# Patient Record
Sex: FEMALE | Race: WHITE | Employment: PART TIME | URBAN - METROPOLITAN AREA
[De-identification: names, ages, dates, MRNs, and addresses within clinical notes are randomized per-mention and may not be internally consistent; named-entity substitution may affect disease eponyms.]

---

## 2018-04-13 PROBLEM — M54.31 SCIATICA OF RIGHT SIDE: Status: ACTIVE | Noted: 2018-04-13

## 2018-08-28 ENCOUNTER — ANESTHESIA EVENT (OUTPATIENT)
Dept: ENDOSCOPY | Facility: HOSPITAL | Age: 48
End: 2018-08-28
Payer: COMMERCIAL

## 2018-08-28 ENCOUNTER — HOSPITAL ENCOUNTER (OUTPATIENT)
Facility: HOSPITAL | Age: 48
Setting detail: HOSPITAL OUTPATIENT SURGERY
Discharge: HOME | End: 2018-08-28
Attending: INTERNAL MEDICINE | Admitting: INTERNAL MEDICINE
Payer: COMMERCIAL

## 2018-08-28 ENCOUNTER — ANESTHESIA (OUTPATIENT)
Dept: ENDOSCOPY | Facility: HOSPITAL | Age: 48
End: 2018-08-28
Payer: COMMERCIAL

## 2018-08-28 ENCOUNTER — SURGERY (OUTPATIENT)
Age: 48
End: 2018-08-28

## 2018-08-28 VITALS
OXYGEN SATURATION: 100 % | RESPIRATION RATE: 14 BRPM | WEIGHT: 210 LBS | DIASTOLIC BLOOD PRESSURE: 88 MMHG | HEART RATE: 53 BPM | BODY MASS INDEX: 32.96 KG/M2 | SYSTOLIC BLOOD PRESSURE: 129 MMHG | HEIGHT: 67 IN

## 2018-08-28 DIAGNOSIS — K62.5 RECTAL BLEEDING: ICD-10-CM

## 2018-08-28 DIAGNOSIS — Z86.010 HISTORY OF COLON POLYPS: ICD-10-CM

## 2018-08-28 LAB — B-HCG UR QL: NEGATIVE

## 2018-08-28 PROCEDURE — 0D5P8ZZ DESTRUCTION OF RECTUM, VIA NATURAL OR ARTIFICIAL OPENING ENDOSCOPIC: ICD-10-PCS | Performed by: INTERNAL MEDICINE

## 2018-08-28 PROCEDURE — 81025 URINE PREGNANCY TEST: CPT

## 2018-08-28 RX ORDER — NALOXONE HYDROCHLORIDE 0.4 MG/ML
80 INJECTION, SOLUTION INTRAMUSCULAR; INTRAVENOUS; SUBCUTANEOUS AS NEEDED
Status: DISCONTINUED | OUTPATIENT
Start: 2018-08-28 | End: 2018-08-28

## 2018-08-28 RX ORDER — LIDOCAINE HYDROCHLORIDE 10 MG/ML
INJECTION, SOLUTION EPIDURAL; INFILTRATION; INTRACAUDAL; PERINEURAL AS NEEDED
Status: DISCONTINUED | OUTPATIENT
Start: 2018-08-28 | End: 2018-08-28 | Stop reason: SURG

## 2018-08-28 RX ORDER — SODIUM CHLORIDE, SODIUM LACTATE, POTASSIUM CHLORIDE, CALCIUM CHLORIDE 600; 310; 30; 20 MG/100ML; MG/100ML; MG/100ML; MG/100ML
INJECTION, SOLUTION INTRAVENOUS CONTINUOUS
Status: DISCONTINUED | OUTPATIENT
Start: 2018-08-28 | End: 2018-08-28

## 2018-08-28 RX ADMIN — LIDOCAINE HYDROCHLORIDE 50 MG: 10 INJECTION, SOLUTION EPIDURAL; INFILTRATION; INTRACAUDAL; PERINEURAL at 09:05:00

## 2018-08-28 RX ADMIN — SODIUM CHLORIDE, SODIUM LACTATE, POTASSIUM CHLORIDE, CALCIUM CHLORIDE: 600; 310; 30; 20 INJECTION, SOLUTION INTRAVENOUS at 08:30:00

## 2018-08-28 NOTE — OPERATIVE REPORT
COLONOSCOPY REPORT    Patient Name:  Jake Freitas Record #: [id number]  YOB: 1970  Date of Procedure: 8/28/2018    Referring physician: Roshni Hodgson MD     Colonoscopy to cecum with hemorrhoidal ligation of 3 columns of diet  Next colonoscopy in 5 years  Patient will follow up with me prn recurrent rectal bleeding    Specimens: none  EBL: minimal

## 2018-08-28 NOTE — ANESTHESIA POSTPROCEDURE EVALUATION
Patient: Dayton Adas    Procedure Summary     Date:  08/28/18 Room / Location:  [address] [place] [address] ENDOSCOPY 01 / [address] [place] [address] ENDOSCOPY    Anesthesia Start:  7874 Anesthesia Stop:  2019    Procedure:  COLONOSCOPY (N/A ) Diagnosis:       Rectal bleeding      History of colon

## 2018-08-28 NOTE — ANESTHESIA PREPROCEDURE EVALUATION
Anesthesia PreOp Note    HPI:     Cherelle Moore is a 50year old female who presents for preoperative consultation requested by: Joel Green MD    Date of Surgery: 8/28/2018    Procedure(s):  COLONOSCOPY  Indication: Rectal bleeding  History of c Right      Comment: Right breast lumpectomy with wire localization      Prescriptions Prior to Admission:  ALLEGRA 180 MG OR TABS 1 TABLET DAILY prn Disp:  Rfl:  8/21/2018   MULTI-VITAMIN OR 1 tablet per day Disp:  Rfl:  8/21/2018       Current Facility-Ad ROS   Abdominal   (+) obese,              Anesthesia Plan:   ASA:  2  Plan:   MAC  Informed Consent Plan and Risks Discussed With:  Patient      I have informed Shane Julio and/or legal guardian or family member of the nature of the anesthetic plan, b

## 2018-08-28 NOTE — H&P
RE-PROCEDURE UPDATE    HPI: Jon Rasheed is a 50year old female. 4/21/1970. Patient presents for a colonoscopy with possible hemorrhoidal ligation. ALLERGIES: No Known Allergies      No current outpatient prescriptions on file.   Past Medical His

## 2018-08-29 PROCEDURE — 88305 TISSUE EXAM BY PATHOLOGIST: CPT | Performed by: OTOLARYNGOLOGY

## (undated) DEVICE — FORCEPS HET BP HMRHD

## (undated) DEVICE — Device: Brand: DEFENDO AIR/WATER/SUCTION AND BIOPSY VALVE

## (undated) DEVICE — ENDOSCOPY PACK - LOWER: Brand: MEDLINE INDUSTRIES, INC.